# Patient Record
Sex: FEMALE | Employment: UNEMPLOYED
[De-identification: names, ages, dates, MRNs, and addresses within clinical notes are randomized per-mention and may not be internally consistent; named-entity substitution may affect disease eponyms.]

---
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
## 2024-02-10 ENCOUNTER — NURSE TRIAGE (OUTPATIENT)
Dept: NURSING | Facility: CLINIC | Age: 24
End: 2024-02-10
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
## 2025-01-10 PROBLEM — Z97.5 NEXPLANON IN PLACE: Status: ACTIVE | Noted: 2021-04-28

## 2025-01-10 PROBLEM — F41.9 ANXIETY: Status: ACTIVE | Noted: 2023-06-24

## 2025-01-10 PROBLEM — B00.9 HERPES SIMPLEX TYPE 1 ANTIBODY POSITIVE: Status: ACTIVE | Noted: 2021-07-16

## 2025-01-10 PROBLEM — F32.A DEPRESSION: Status: ACTIVE | Noted: 2022-06-06

## 2025-01-23 ENCOUNTER — OFFICE VISIT (OUTPATIENT)
Dept: FAMILY MEDICINE | Facility: CLINIC | Age: 25
End: 2025-01-23
Payer: COMMERCIAL

## 2025-01-23 VITALS
BODY MASS INDEX: 24.69 KG/M2 | OXYGEN SATURATION: 99 % | TEMPERATURE: 97.9 F | RESPIRATION RATE: 18 BRPM | HEART RATE: 77 BPM | WEIGHT: 153 LBS | SYSTOLIC BLOOD PRESSURE: 112 MMHG | DIASTOLIC BLOOD PRESSURE: 69 MMHG

## 2025-01-23 DIAGNOSIS — N89.8 VAGINAL DISCHARGE: ICD-10-CM

## 2025-01-23 DIAGNOSIS — Z11.3 SCREEN FOR STD (SEXUALLY TRANSMITTED DISEASE): Primary | ICD-10-CM

## 2025-01-23 ASSESSMENT — ENCOUNTER SYMPTOMS
DYSURIA: 0
FEVER: 0
COUGH: 0

## 2025-01-23 NOTE — PROGRESS NOTES
Assessment & Plan       ICD-10-CM    1. Screen for STD (sexually transmitted disease)  Z11.3 Chlamydia trachomatis/Neisseria gonorrhoeae by PCR - Clinic Collect     HIV Antigen Antibody Combo     Treponema Abs w Reflex to RPR and Titer     Hepatitis C Screen Reflex to HCV RNA Quant and Genotype     Herpes Simplex Virus 1 and 2 IgG     HIV Antigen Antibody Combo     Treponema Abs w Reflex to RPR and Titer     Hepatitis C Screen Reflex to HCV RNA Quant and Genotype     Herpes Simplex Virus 1 and 2 IgG      2. Vaginal discharge  N89.8 Wet prep           Patient instructions:  We will call you with positive results. Please go to [facility] or other clinic to have wet prep completed.     Medical decision making:  Pt presents with vaginal discharge x past few days and new sexual partner. Feels that it is most likely BV or yeast but not sure which, would like to get screened for STIs as well. STI labs collected here, sent to [facility] to do wet prep as we do not have the supplies here to complete the test. Pt doesn't know how to log into her [employer] so would like a call for positive results.       No follow-ups on file.    At the end of the encounter, I discussed results, diagnosis, medications. Discussed red flags for immediate return to clinic/ER, as well as indications for follow up if no improvement. Patient understood and agreed to plan. Patient was stable for discharge.    Jose Jones is a 24 year old female who presents to clinic today the following health issues:  Chief Complaint   Patient presents with    STD     Patient here requesting STD screening.      Pt reports that she has some new vaginal discharge and itching. Reports new sexual partner, no known STI exposures but would like STI testing including herpes. Notified patient that herpes can come back positive even if you have just had cold sores but she would still like testing.             Review of Systems   Constitutional:  Negative for fever.   HENT:   Negative for congestion.    Respiratory:  Negative for cough.    Genitourinary:  Positive for vaginal discharge. Negative for dysuria and urgency.       Problem List:  2023-06: Anxiety  2022-06: Depression  2021-07: Herpes simplex type 1 antibody positive  2021-04: Nexplanon in place  2008-09: NO ACTIVE PROBLEMS  2004-09: Routine infant or child health check      Past Medical History:   Diagnosis Date    Routine infant or child health check        Social History     Tobacco Use    Smoking status: Every Day     Types: Vaping Device     Passive exposure: Yes    Smokeless tobacco: Never    Tobacco comments:     mom   Substance Use Topics    Alcohol use: Not on file           Objective    /69 (BP Location: Right arm, Patient Position: Sitting, Cuff Size: Adult Regular)   Pulse 77   Temp 97.9  F (36.6  C) (Tympanic)   Resp 18   Wt 69.4 kg (153 lb)   LMP 10/17/2024   SpO2 99%   BMI 24.69 kg/m    Physical Exam  Constitutional:       Appearance: Normal appearance.   Cardiovascular:      Rate and Rhythm: Normal rate.   Pulmonary:      Effort: Pulmonary effort is normal.   Neurological:      General: No focal deficit present.      Mental Status: She is alert.              YAYA MEDINA CNP

## 2025-01-23 NOTE — PATIENT INSTRUCTIONS
Please call central scheduling to make an appointment at [phone].    [facility] is located at [address] For wet prep.

## 2025-01-27 ENCOUNTER — OFFICE VISIT (OUTPATIENT)
Dept: FAMILY MEDICINE | Facility: CLINIC | Age: 25
End: 2025-01-27
Payer: COMMERCIAL

## 2025-01-27 VITALS
TEMPERATURE: 98.4 F | WEIGHT: 152.4 LBS | RESPIRATION RATE: 17 BRPM | HEIGHT: 66 IN | SYSTOLIC BLOOD PRESSURE: 101 MMHG | BODY MASS INDEX: 24.49 KG/M2 | HEART RATE: 80 BPM | DIASTOLIC BLOOD PRESSURE: 65 MMHG | OXYGEN SATURATION: 98 %

## 2025-01-27 DIAGNOSIS — F33.1 MODERATE EPISODE OF RECURRENT MAJOR DEPRESSIVE DISORDER (H): ICD-10-CM

## 2025-01-27 DIAGNOSIS — B96.89 BACTERIAL VAGINOSIS: ICD-10-CM

## 2025-01-27 DIAGNOSIS — N89.8 VAGINAL ODOR: Primary | ICD-10-CM

## 2025-01-27 DIAGNOSIS — J39.8 CONGESTION OF UPPER RESPIRATORY TRACT: ICD-10-CM

## 2025-01-27 DIAGNOSIS — N76.0 BACTERIAL VAGINOSIS: ICD-10-CM

## 2025-01-27 PROBLEM — Z97.5 NEXPLANON IN PLACE: Status: RESOLVED | Noted: 2021-04-28 | Resolved: 2025-01-27

## 2025-01-27 LAB
CLUE CELLS: PRESENT
TRICHOMONAS, WET PREP: ABNORMAL
WBC'S/HIGH POWER FIELD, WET PREP: ABNORMAL
YEAST, WET PREP: ABNORMAL

## 2025-01-27 PROCEDURE — 96127 BRIEF EMOTIONAL/BEHAV ASSMT: CPT

## 2025-01-27 PROCEDURE — 87210 SMEAR WET MOUNT SALINE/INK: CPT

## 2025-01-27 PROCEDURE — 99214 OFFICE O/P EST MOD 30 MIN: CPT

## 2025-01-27 RX ORDER — METRONIDAZOLE 7.5 MG/G
1 GEL VAGINAL DAILY
Qty: 25 G | Refills: 0 | Status: SHIPPED | OUTPATIENT
Start: 2025-01-27 | End: 2025-02-01

## 2025-01-27 ASSESSMENT — COLUMBIA-SUICIDE SEVERITY RATING SCALE - C-SSRS
2. IN THE PAST MONTH, HAVE YOU ACTUALLY HAD ANY THOUGHTS OF KILLING YOURSELF?: NO
6. HAVE YOU EVER DONE ANYTHING, STARTED TO DO ANYTHING, OR PREPARED TO DO ANYTHING TO END YOUR LIFE?: NO
1. WITHIN THE PAST MONTH, HAVE YOU WISHED YOU WERE DEAD OR WISHED YOU COULD GO TO SLEEP AND NOT WAKE UP?: NO

## 2025-01-27 ASSESSMENT — PAIN SCALES - GENERAL: PAINLEVEL_OUTOF10: NO PAIN (0)

## 2025-01-27 ASSESSMENT — PATIENT HEALTH QUESTIONNAIRE - PHQ9
SUM OF ALL RESPONSES TO PHQ QUESTIONS 1-9: 11
SUM OF ALL RESPONSES TO PHQ QUESTIONS 1-9: 11
10. IF YOU CHECKED OFF ANY PROBLEMS, HOW DIFFICULT HAVE THESE PROBLEMS MADE IT FOR YOU TO DO YOUR WORK, TAKE CARE OF THINGS AT HOME, OR GET ALONG WITH OTHER PEOPLE: SOMEWHAT DIFFICULT

## 2025-01-27 NOTE — PATIENT INSTRUCTIONS
I am listing some things below that can help to manage congestion. Try these for a couple of weeks and see if they help    Antihistamine: Zyrtec, claritin, or allegra daily to reduce allergic inflammation    Flonase: I would like you to begin using Flonase once daily for the next week.  This medication will help with extra inflammation in your sinuses that will allow for other medications to work more effectively so that your sinuses can drain.  Please use 1 to 2 sprays in each nostril once daily.    Saline spray: I would like you to  with saline spray over-the-counter.  This works best when you lean slightly forward, insert the spray nozzle into your nostril, and direct the nozzle towards the opposite side of your face.  This is sometimes easier to be done in the shower over the sink as it can get a little bit messy.  You will know that you have done this correctly if your eyes slightly water after spraying the solution.  You can do this as prescribed on the box for as long as it takes to treat your symptoms.    Ibuprofen and Tylenol: You can take ibuprofen and Tylenol as prescribed on the box around the clock.  You can either take them on alternating schedules or you can take them together.  These medications work differently in your body, and are safe to be taken together.    Humidifier: Using a humidifier in the area that you sleep or taking a very hot shower to inhale some of the vaporized steam can help to open up your nasal passages and sinuses and encourage them to drain.    Rest: get adequate rest including 7-8 hours of sleep, and low activity levels during the day to encourage healing. Avoid high impact exercise and rigorous physical labor    Nutrition: support healing by fueling your body with healthy foods. Fruits, vegetables, and whole foods are all great options!    Hydration: increase fluid intake. Illness leads to increased dehydration, so your body needs more fluid intake than it might when  you are healthy. Pedersen and sugar free teas are great options. Try to avoid beverages that cause more dehydration including coffee, soda, energy drinks, and alcohol.     It is important to seek immediate medical attention if you are having symptoms of chest pain, fever, shortness of breath, palpitations, or any changes in your mental status.

## 2025-01-27 NOTE — PROGRESS NOTES
Assessment & Plan     (N89.8) Vaginal odor  (primary encounter diagnosis)  (N76.0,  B96.89) Bacterial vaginosis  Comment: Acute and stable.  No concerns for abnormal discharge or vaginal irritation.  STI screening in the last week is unremarkable.  Patient declines any possibility for pregnancy, and declines pregnancy testing today.  First day of last menstrual cycle was October 17, and patient has since had an abortive pregnancy experience.  Talked patient through treatment options for both BV and yeast, and reiterated that bacterial vaginosis and yeast infections are not sexually transmitted her contact related.  Will update the plan of care appropriately based on the wet prep results today. Offered education on medications including appropriate dosing, possible side effects, and possible adverse effects.  Wet prep indicates presence of clue cells, which is clinically diagnostic for bacterial vaginosis.  Will treat with 5 days of MetroGel for management.  Education given on return to clinic instructions as well as alarm signs that would require the need for immediate medical attention.  Patient attested to understanding.  Plan: Wet prep - Clinic Collect, metroNIDAZOLE         (METROGEL) 0.75 % vaginal gel    (J39.8) Congestion of upper respiratory tract  Comment: Chronic and ongoing.  Patient continues to have sinus and upper airway congestion.  She has tried some very mild over-the-counter options, but has not tried anything dedicated.  Listing a number of supportive therapy options in the after visit summary that can be obtained over-the-counter, and would like her to follow-up in about 2 weeks if symptoms are persisting or not improved with this current plan.  Not concern for acute illness symptoms, so we will hold off on further viral testing today. Offered education on medications including appropriate dosing, possible side effects, and possible adverse effects.  Education given on return to clinic  instructions as well as alarm signs that would require the need for immediate medical attention.  Patient attested to understanding.    (F33.1) Moderate episode of recurrent major depressive disorder (H)  Comment: Chronic and unmanaged.  Patient continues to have depressive symptoms as evidenced by PHQ-9 score of 11.  No concerns for active suicidal ideation or plans for self-harm.  Patient is not currently taking any medication, and declines need for referral, or desire to initiate medication today.  Attaching suicide safety plan information to after visit summary, and reiterated that if patient changed her mind about need for referrals or initiating medication management, that she could reach out via [employer] at any time or schedule an appointment with myself or her primary.  Patient verbalized understanding and agreement to plan.      This progress note has been dictated, with use of voice recognition software. Any grammatical, typographical, or context errors are unintentional and inherent to use of voice recognition software.       Ordering of each unique test  I spent a total of 18 minutes on the day of the visit.   Time spent by me today doing chart review, history and exam, documentation and further activities per the note      Depression Screening Follow Up        1/27/2025     2:41 PM   PHQ   PHQ-9 Total Score 11    Q9: Thoughts of better off dead/self-harm past 2 weeks Several days   F/U: Thoughts of suicide or self-harm No   F/U: Safety concerns No       Patient-reported         1/27/2025     2:41 PM   Last PHQ-9   1.  Little interest or pleasure in doing things 1   2.  Feeling down, depressed, or hopeless 1   3.  Trouble falling or staying asleep, or sleeping too much 2   4.  Feeling tired or having little energy 2   5.  Poor appetite or overeating 1   6.  Feeling bad about yourself 1   7.  Trouble concentrating 1   8.  Moving slowly or restless 1   Q9: Thoughts of better off dead/self-harm past 2 weeks  1   PHQ-9 Total Score 11    In the past two weeks have you had thoughts of suicide or self harm? No   Do you have concerns about your personal safety or the safety of others? No       Patient-reported               1/27/2025     3:05 PM   C-SSRS ([place])   Within the last month, have you wished you were dead or wished you could go to sleep and not wake up? No   Within the last month, have you had any actual thoughts of killing yourself? No   Within the last month, have you ever done anything, started to do anything, or prepared to do anything to end your life? No               Follow Up Actions Taken  Crisis resource information provided in the After Visit Summary  Patient to follow up with PCP.  Clinic staff to schedule appointment if able.  Patient declined referral.      FUTURE APPOINTMENTS:       - Follow-up visit in 1 to 2 weeks if symptoms are not improving       - Follow-up for annual visit or as needed  Patient Instructions   I am listing some things below that can help to manage congestion. Try these for a couple of weeks and see if they help    Antihistamine: Zyrtec, claritin, or allegra daily to reduce allergic inflammation    Flonase: I would like you to begin using Flonase once daily for the next week.  This medication will help with extra inflammation in your sinuses that will allow for other medications to work more effectively so that your sinuses can drain.  Please use 1 to 2 sprays in each nostril once daily.    Saline spray: I would like you to  with saline spray over-the-counter.  This works best when you lean slightly forward, insert the spray nozzle into your nostril, and direct the nozzle towards the opposite side of your face.  This is sometimes easier to be done in the shower over the sink as it can get a little bit messy.  You will know that you have done this correctly if your eyes slightly water after spraying the solution.  You can do this as prescribed on the box for as  long as it takes to treat your symptoms.    Ibuprofen and Tylenol: You can take ibuprofen and Tylenol as prescribed on the box around the clock.  You can either take them on alternating schedules or you can take them together.  These medications work differently in your body, and are safe to be taken together.    Humidifier: Using a humidifier in the area that you sleep or taking a very hot shower to inhale some of the vaporized steam can help to open up your nasal passages and sinuses and encourage them to drain.    Rest: get adequate rest including 7-8 hours of sleep, and low activity levels during the day to encourage healing. Avoid high impact exercise and rigorous physical labor    Nutrition: support healing by fueling your body with healthy foods. Fruits, vegetables, and whole foods are all great options!    Hydration: increase fluid intake. Illness leads to increased dehydration, so your body needs more fluid intake than it might when you are healthy. Pedersen and sugar free teas are great options. Try to avoid beverages that cause more dehydration including coffee, soda, energy drinks, and alcohol.     It is important to seek immediate medical attention if you are having symptoms of chest pain, fever, shortness of breath, palpitations, or any changes in your mental status.     Subjective   Karen is a 24 year old, presenting for the following health issues:  office visit (Patient reports they are here for STI testing. Went to clinic at [facility] and did STD testing there but they didn't do STI so she came here. )        1/27/2025     2:45 PM   Additional Questions   Roomed by Liz   Accompanied by alone         1/27/2025     2:45 PM   Patient Reported Additional Medications   Patient reports taking the following new medications none     History of Present Illness       Reason for visit:  Sti testing   She is taking medications regularly.       Karen is a 24-year-old female with a past medical history significant  for depression, anxiety, and HSV 1 who presents today desiring vaginal swabbing.  Patient reports that within the last week she has been seen for STI screening at a quick clinic in the [facility].  They did screen her for a number of sexually transmitted infections including HSV, HIV, syphilis, chlamydia, and gonorrhea, and all of these results were negative aside from a compartment of HSV 1 result without concern for active infection.  Patient presents today, as she was desiring additional wet prep screening for bacterial vaginosis and yeast infection that was not done at the clinic that day.  She declines any vaginal itching or burning, or abnormal discharge from her vagina, but does attest to increased vaginal odor that is causing her concern.  She has been diagnosed with BV in the past, and is worried that this is what is causing her current concerns.  Patient previously had a Nexplanon for pregnancy prevention, but has since taken this out, as she did not like how heavy her bleeding was with the use of this.  She is currently not utilizing any pregnancy prevention methods, and the first day of her last menstrual period was .  She did become pregnant after that time, but did have an .  She has not gotten her menstrual cycle back since that time.  She declines any possibility for pregnancy.  She declines any known exposure to sexually transmitted infection.  She declines any abdominal pain, nausea, vomiting, diarrhea, hematuria, dysuria, urinary urgency or frequency, or open sores or lesions in her genital area.  She feels completely well otherwise without concerns for fever, chills, body aches, or upper respiratory illness symptoms.  She does attest to some more chronic congestion in her throat and nasal passages.  She is wondering about some remedies to treat this.  She does have a history of depression, but is not currently been taking any medication to manage this, and does not see a  "therapist.  She has historically tried an SSRI that did not work well to treat her mental health concerns.  She does attest to several days of thoughts of self-harm or that she be better off dead, but declines any thoughts for suicide or plans for self-harm.  She feels safe in her current living situation.        1/27/2025     2:41 PM   PHQ   PHQ-9 Total Score 11    Q9: Thoughts of better off dead/self-harm past 2 weeks Several days   F/U: Thoughts of suicide or self-harm No   F/U: Safety concerns No       Patient-reported      Review of Systems  Constitutional, HEENT, cardiovascular, pulmonary, gi and gu systems are negative, except as otherwise noted.      Objective    /65 (BP Location: Left arm, Patient Position: Sitting, Cuff Size: Adult Regular)   Pulse 80   Temp 98.4  F (36.9  C) (Temporal)   Resp 17   Ht 1.664 m (5' 5.5\")   Wt 69.1 kg (152 lb 6.4 oz)   LMP 10/17/2024   SpO2 98%   BMI 24.97 kg/m    Body mass index is 24.97 kg/m .  Physical Exam   GENERAL: healthy, alert and no distress  NECK: no adenopathy, no asymmetry, masses, or scars  RESP: Easy rate, depth, and effort of breathing.  No visible use of accessory muscles.  No rapid respiratory rate or increased work of breathing appreciated.  CV: peripheral edema, clubbing, or cyanosis  MS: no gross musculoskeletal defects noted, no edema  NEURO: Normal strength and tone, mentation intact and speech normal    Carli Giang DNP FNP-C  Family Nurse Practitioner - Same Day Provider  [facility] [place]        Signed Electronically by: YAYA Monte CNP    "

## 2025-02-02 ENCOUNTER — HEALTH MAINTENANCE LETTER (OUTPATIENT)
Age: 25
End: 2025-02-02

## 2025-09-03 ENCOUNTER — E-VISIT (OUTPATIENT)
Dept: URGENT CARE | Facility: CLINIC | Age: 25
End: 2025-09-03
Payer: COMMERCIAL

## 2025-09-03 ENCOUNTER — OFFICE VISIT (OUTPATIENT)
Dept: URGENT CARE | Facility: URGENT CARE | Age: 25
End: 2025-09-03
Payer: COMMERCIAL

## 2025-09-03 VITALS
RESPIRATION RATE: 20 BRPM | HEIGHT: 65 IN | BODY MASS INDEX: 27.16 KG/M2 | DIASTOLIC BLOOD PRESSURE: 77 MMHG | HEART RATE: 67 BPM | TEMPERATURE: 98.1 F | WEIGHT: 163 LBS | SYSTOLIC BLOOD PRESSURE: 120 MMHG | OXYGEN SATURATION: 96 %

## 2025-09-03 DIAGNOSIS — R05.1 ACUTE COUGH: ICD-10-CM

## 2025-09-03 DIAGNOSIS — L50.9 HIVES: ICD-10-CM

## 2025-09-03 DIAGNOSIS — R21 RASH AND NONSPECIFIC SKIN ERUPTION: Primary | ICD-10-CM

## 2025-09-03 DIAGNOSIS — R05.2 SUBACUTE COUGH: ICD-10-CM

## 2025-09-03 DIAGNOSIS — R21 RASH: Primary | ICD-10-CM

## 2025-09-03 PROCEDURE — 99207 PR NON-BILLABLE SERV PER CHARTING: CPT | Performed by: FAMILY MEDICINE

## 2025-09-03 PROCEDURE — 99213 OFFICE O/P EST LOW 20 MIN: CPT | Performed by: PHYSICIAN ASSISTANT

## 2025-09-03 RX ORDER — GUAIFENESIN 600 MG/1
1200 TABLET, EXTENDED RELEASE ORAL 2 TIMES DAILY
Qty: 28 TABLET | Refills: 0 | Status: SHIPPED | OUTPATIENT
Start: 2025-09-03 | End: 2025-09-10

## 2025-09-03 RX ORDER — PREDNISONE 20 MG/1
20 TABLET ORAL DAILY
Qty: 5 TABLET | Refills: 0 | Status: SHIPPED | OUTPATIENT
Start: 2025-09-03 | End: 2025-09-08

## 2025-09-03 RX ORDER — DIPHENHYDRAMINE HCL 25 MG
50 TABLET ORAL
Qty: 30 TABLET | Refills: 0 | Status: SHIPPED | OUTPATIENT
Start: 2025-09-03

## 2025-09-03 RX ORDER — CETIRIZINE HYDROCHLORIDE 10 MG/1
10 TABLET ORAL DAILY
Qty: 30 TABLET | Refills: 0 | Status: SHIPPED | OUTPATIENT
Start: 2025-09-03